# Patient Record
(demographics unavailable — no encounter records)

---

## 2025-03-19 NOTE — REVIEW OF SYSTEMS
"Date of Last Office Visit: 4/18/24 Kathleen  RTC: 6 wks  No shows: 0  Cancellations: 0  Date of Next Office Visit: 6/7/24  ------------------------------    Incoming refill from Pharmacy via interface  Medication requested:    DULoxetine (CYMBALTA) 60 MG capsule 60 capsule 0 4/18/2024 -- No   Sig - Route: Take 2 capsules (120 mg) by mouth every evening - Oral     ------------------------------  From last visit note:   \"continue duloxetine 120 mg at night \"       Refill decision: Medication refilled 30d per protocol.                     " [Negative] : Respiratory

## 2025-03-19 NOTE — HISTORY OF PRESENT ILLNESS
[Never] : never [TextBox_4] : [facility] 911 certification [facility]  exposure Certification diagnosis basal cell skin carcinoma since last  visit no  change in medical history  Past medical history Crohn's disease Eczema GERD  Medications [facility] at present on hold OFF Dupixent Supplemental vitamins Supplemental magnesium Collagen  Reports positive seasonal outdoor allergies  Family history Mother is  age 54 a  Father  age 68 heart disease 1 sibling reported with a history of dementia and cardiac heart issues  Systems review Lower respiratory symptoms positive cough Did not report wheeze shortness of breath chest tightness Did not report history of asthma COPD bronchitis pneumonia Upper respiratory symptoms did not report history of chronic sinusitis or chronic rhinitis Cardiovascular Did not report chest burning chest pressure chest pain or palpitations Gastrointestinal Positive GERD Noted Crohn's disease Musculoskeletal not applicable Additional history Arthritis reported  Social history Reports being non-smoker Social alcohol Positive anxiety

## 2025-03-19 NOTE — PROCEDURE
[FreeTextEntry1] : Blood draw  EKG 9/2/2025 Sinus tachycardia Heart rate 102 Low-voltage prominent RV Noted With palpable pulse following EKG heart rate 90 regular Cardiac intervals otherwise normal  PFT March 19, 2025 Spirometry normal No bronchodilator response in FEV1 Lung volumes normal Total lung capacity 97% predicted No air trapping Diffusion normal range 93% predicted Hemoglobin 14.5  Chest x-ray PA lateral March 19, 2025 Heart size normal Lung fields are clear There are no parenchymal infiltrates pleural effusions dominant pulmonary nodules Soft tissue bony structures unremarkable Mediastinum unremarkable Overall impression clear lungs  Chest x-ray PA lateral March 13, 2024 Cardiac size normal Lung fields are clear No parenchymal infiltrate pleural effusion dominant pulmonary nodules Soft tissue bony structures unremarkable Mediastinum unremarkable Overall impression Clear lungs  EKG March 13, 2024 Normal sinus rhythm Heart rate 93 No cardiac interval changes  PFT March 13, 2024 Spirometry is normal Lung labs normal No air-trapping Diffusion normal range 84% predicted Hemoglobin 13.4  Blood draw

## 2025-03-19 NOTE — DISCUSSION/SUMMARY
[FreeTextEntry1] : [facility] 911 certification [facility] 9/11 exposure Certification diagnosis basal cell carcinoma GERD Past medical history Crohn's disease Eczema  Patient states she is up-to-date with breast mammogram completed today March 13, 2024 Patient is up-to-date GI follow-up maintain up-to-date colorectal screening Maintain up-to-date vaccine protocol including but not limited to COVID flu RSV pneumonia shingles Baseline blood work as per [facility] protocol CBC comprehensive metabolic profile lipid profile Noted medication change to oral budesonide with GI management 1 year follow-up

## 2025-03-19 NOTE — PHYSICAL EXAM
[No Acute Distress] : no acute distress [Normal Oropharynx] : normal oropharynx [I] : Mallampati Class: I [Normal Appearance] : normal appearance [Supple] : supple [No Neck Mass] : no neck mass [No JVD] : no jvd [Normal Rate/Rhythm] : normal rate/rhythm [Normal S1, S2] : normal s1, s2 [No Murmurs] : no murmurs [No Rubs] : no rubs [No Gallops] : no gallops [No Resp Distress] : no resp distress [No Acc Muscle Use] : no acc muscle use [Normal Palpation] : normal palpation [Normal Rhythm and Effort] : normal rhythm and effort [Clear to Auscultation Bilaterally] : clear to auscultation bilaterally [Normal to Percussion] : normal to percussion [No Abnormalities] : no abnormalities [Benign] : benign [Not Tender] : not tender [Soft] : soft [No HSM] : no hsm [Normal Bowel Sounds] : normal bowel sounds [Normal Gait] : normal gait [No Clubbing] : no clubbing [No Cyanosis] : no cyanosis [No Edema] : no edema [FROM] : FROM [Normal Color/ Pigmentation] : normal color/ pigmentation [No Focal Deficits] : no focal deficits [Oriented x3] : oriented x3 [Normal Affect] : normal affect